# Patient Record
(demographics unavailable — no encounter records)

---

## 2025-05-09 NOTE — HISTORY OF PRESENT ILLNESS
[Born at ___ Wks Gestation] : The patient was born at [unfilled] weeks gestation [] : via normal spontaneous vaginal delivery [Vacuum] : with vacuum use [[facility]] : at [facility] [BW: _____] : weight of [unfilled] [Length: _____] : length of [unfilled] [DW: _____] : Discharge weight was [unfilled] [Age: ___] : [unfilled] year old mother [Breast milk] : breast milk [Formula ___ oz/feed] : [unfilled] oz of formula per feed [Normal] : Normal [Yellow] : yellow [Seedy] : seedy [Loose] : loose consistency [In Bassinet/Crib] : sleeps in bassinet/crib [No] : No cigarette smoke exposure [Rear facing car seat in back seat] : Rear facing car seat in back seat [Carbon Monoxide Detectors] : Carbon monoxide detectors at home [Smoke Detectors] : Smoke detectors at home. [Hepatitis B Vaccine Given] : Hepatitis B vaccine given [NO] : No [FreeTextEntry7] : [id number]

## 2025-05-09 NOTE — PHYSICAL EXAM

## 2025-05-09 NOTE — DISCUSSION/SUMMARY
[FreeTextEntry1] : 5 day old female here for weight check. Gained 2 oz but still below birth weight. Bilirubin stable.   c/w current feeds weight check in 1 week

## 2025-05-09 NOTE — DISCUSSION/SUMMARY
[ Transition] :  transition [ Care] :  care [Nutritional Adequacy] : nutritional adequacy [Parental Well-Being] : parental well-being [Safety] : safety [Hepatitis B In Hospital] : Hepatitis B administered while in the hospital [Mother] : mother [Father] : father [Parental Concerns Addressed] : Parental concerns addressed [FreeTextEntry1] : 3 day old female here for WCC.   Hyperbilirubinemia - likely secondary to combination of physiologic & Breastfeeding jaundice - recheck in 48 hours    WCC - continue ad jarrod feeds, encouraged breast feeding - continue monitoring elimination, return for <4 voids per 24hrs for concern for dehydration - return for stools that are hard or colored gray, black or red - continue safe sleep practice, encourage separate sleeping space and back -to-sleep - increase tummy time to few times per day when awake - advised appropriate car seat placement - Anticipatory guidance provided regarding fevers in  period - NBS pending - Return in 1 week for Weight check.

## 2025-05-09 NOTE — HISTORY OF PRESENT ILLNESS
[de-identified] : weight check on similac 360 [FreeTextEntry6] : 5 day old female here for weight & bili check.   Feeding formula mostly.  Mom is pumping but limited supply still. planning to meet with lactation Good wet diapers, soft stool

## 2025-05-17 NOTE — HISTORY OF PRESENT ILLNESS
[de-identified] : weight check on similac 360 [FreeTextEntry6] : 13 day old female here for weight check.   Feeding formula 2 oz every 2-3 hours >5 wet diapers soft BM

## 2025-05-17 NOTE — DISCUSSION/SUMMARY
[FreeTextEntry1] : 13 day old female here for weight check. ,Gaining weight well. Noted to have hip click today - to recheck at 1 month.

## 2025-05-17 NOTE — PHYSICAL EXAM
[NL] : regular rate and rhythm, normal S1, S2 audible, no murmurs [de-identified] : bilateral hip click

## 2025-06-21 NOTE — HISTORY OF PRESENT ILLNESS
[Parents] : parents [Normal] : Normal [In Bassinet/Crib] : sleeps in bassinet/crib [On back] : sleeps on back [No] : No cigarette smoke exposure [Rear facing car seat in back seat] : Rear facing car seat in back seat [Carbon Monoxide Detectors] : Carbon monoxide detectors at home [Smoke Detectors] : Smoke detectors at home. [de-identified] : SIMILAC 360 TOTAL CARE - 3 oz every 2 hours. [FreeTextEntry9] : HOME

## 2025-06-21 NOTE — PHYSICAL EXAM

## 2025-06-21 NOTE — DISCUSSION/SUMMARY
[Parental Well-Being] : parental well-being [Family Adjustment] : family adjustment [Feeding Routines] : feeding routines [Infant Adjustment] : infant adjustment [Safety] : safety [Mother] : mother [Father] : father [Parental Concerns Addressed] : Parental concerns addressed [] : The components of the vaccine(s) to be administered today are listed in the plan of care. The disease(s) for which the vaccine(s) are intended to prevent and the risks have been discussed with the caretaker.  The risks are also included in the appropriate vaccination information statements which have been provided to the patient's caregiver.  The caregiver has given consent to vaccinate. [FreeTextEntry1] : 1 month old female here for WCC.   WCC - Appropriate growth & Development for age - continue ad jarrod feeds, return for feeding intolerance - continue monitoring elimination, minimum 4 voids per 24hrs - continue safe sleep practice - alone, on back and in crib/bassinet. No toys, stuffed, animals, heavy blankets or bumpers - encouraged tummy time to improve head control when awake - advised appropriate car seat placement - Reviewed anticipatory guidance regarding fever - Hep B given today - Return for 2 month WCC

## 2025-07-19 NOTE — HISTORY OF PRESENT ILLNESS
[Mother] : mother [Father] : father [Formula ___ oz/feed] : [unfilled] oz of formula per feed [Normal] : Normal [In Bassinet/Crib] : sleeps in bassinet/crib [No] : No cigarette smoke exposure [Rear facing car seat in back seat] : Rear facing car seat in back seat [Smoke Detectors] : Smoke detectors at home.

## 2025-07-19 NOTE — PHYSICAL EXAM
[Alert] : alert [Normocephalic] : normocephalic [Flat Open Anterior [place]] : flat open anterior fontanelle [PERRL] : PERRL [Red Reflex Bilateral] : red reflex bilateral [Normally Placed Ears] : normally placed ears [Auricles Well Formed] : auricles well formed [Clear Tympanic membranes] : clear tympanic membranes [Light reflex present] : light reflex present [Bony landmarks visible] : bony landmarks visible [Nares Patent] : nares patent [Palate Intact] : palate intact [Uvula Midline] : uvula midline [Supple, full passive range of motion] : supple, full passive range of motion [Symmetric Chest Rise] : symmetric chest rise [Clear to Auscultation Bilaterally] : clear to auscultation bilaterally [Regular Rate and Rhythm] : regular rate and rhythm [S1, S2 present] : S1, S2 present [+2 Femoral Pulses] : +2 femoral pulses [Soft] : soft [Bowel Sounds] : bowel sounds present [Normal external genitailia] : normal external genitalia [Patent Vagina] : vagina patent [Normally Placed] : normally placed [No Abnormal Lymph Nodes Palpated] : no abnormal lymph nodes palpated [Symmetric Flexed Extremities] : symmetric flexed extremities [Startle Reflex] : startle reflex present [Suck Reflex] : suck reflex present [Rooting] : rooting reflex present [Palmar Grasp] : palmar grasp reflex present [Plantar Grasp] : plantar grasp reflex present [Symmetric [place]] : symmetric [place] [Acute Distress] : no acute distress [Discharge] : no discharge [Palpable Masses] : no palpable masses [Murmurs] : no murmurs [Tender] : nontender [Distended] : not distended [Hepatomegaly] : no hepatomegaly [Splenomegaly] : no splenomegaly [Clitoromegaly] : no clitoromegaly [Zuniga-Ortolani] : negative Zuniga-Ortolani [Spinal Dimple] : no spinal dimple [Tuft of Hair] : no tuft of hair [Rash and/or lesion present] : no rash/lesion